# Patient Record
Sex: FEMALE | Race: BLACK OR AFRICAN AMERICAN | NOT HISPANIC OR LATINO | URBAN - METROPOLITAN AREA
[De-identification: names, ages, dates, MRNs, and addresses within clinical notes are randomized per-mention and may not be internally consistent; named-entity substitution may affect disease eponyms.]

---

## 2019-11-22 PROBLEM — 414916001 OBESITY: Status: ACTIVE | Noted: 2019-11-22

## 2019-11-22 PROBLEM — 102614006 GENERALIZED ABDOMINAL PAIN: Status: ACTIVE | Noted: 2019-11-22

## 2019-11-22 PROBLEM — 87522002 IRON DEFICIENCY ANEMIA: Status: ACTIVE | Noted: 2019-11-22

## 2019-11-22 PROBLEM — 422587007 NAUSEA: Status: ACTIVE | Noted: 2019-11-22

## 2019-11-22 PROBLEM — 275978004 SCREENING FOR MALIGNANT NEOPLASM OF COLON: Status: ACTIVE | Noted: 2019-11-22

## 2019-11-22 PROBLEM — 442076002 EARLY SATIETY: Status: ACTIVE | Noted: 2019-11-22

## 2019-11-22 PROBLEM — 14760008 CONSTIPATION: Status: ACTIVE | Noted: 2019-11-22

## 2022-04-30 ENCOUNTER — TELEPHONE ENCOUNTER (OUTPATIENT)
Dept: URBAN - METROPOLITAN AREA CLINIC 121 | Facility: CLINIC | Age: 57
End: 2022-04-30

## 2022-05-01 ENCOUNTER — TELEPHONE ENCOUNTER (OUTPATIENT)
Dept: URBAN - METROPOLITAN AREA CLINIC 121 | Facility: CLINIC | Age: 57
End: 2022-05-01

## 2022-05-01 RX ORDER — ONDANSETRON 8 MG/1
TAKE 1 TABLET PO 30MIN BEFORE EACH DOSE FOR BOWEL PREP TABLET ORAL
Status: ACTIVE | COMMUNITY
Start: 2019-11-22

## 2022-05-01 RX ORDER — SODIUM SULFATE, POTASSIUM SULFATE, MAGNESIUM SULFATE 17.5; 3.13; 1.6 G/ML; G/ML; G/ML
MIX AND USE AS DIRECTED SOLUTION, CONCENTRATE ORAL
Status: ACTIVE | COMMUNITY
Start: 2019-11-22

## 2022-05-01 RX ORDER — METFORMIN HYDROCHLORIDE 1000 MG/1
BID TABLET, EXTENDED RELEASE ORAL
Status: ACTIVE | COMMUNITY
Start: 2019-12-06

## 2024-03-26 ENCOUNTER — OV NP (OUTPATIENT)
Dept: URBAN - METROPOLITAN AREA CLINIC 88 | Facility: CLINIC | Age: 59
End: 2024-03-26
Payer: MEDICARE

## 2024-03-26 ENCOUNTER — LAB (OUTPATIENT)
Dept: URBAN - METROPOLITAN AREA CLINIC 88 | Facility: CLINIC | Age: 59
End: 2024-03-26

## 2024-03-26 VITALS
TEMPERATURE: 98.4 F | BODY MASS INDEX: 34.55 KG/M2 | SYSTOLIC BLOOD PRESSURE: 119 MMHG | WEIGHT: 215 LBS | DIASTOLIC BLOOD PRESSURE: 75 MMHG | OXYGEN SATURATION: 100 % | HEART RATE: 91 BPM | HEIGHT: 66 IN

## 2024-03-26 DIAGNOSIS — R11.2 NAUSEA AND VOMITING IN ADULT: ICD-10-CM

## 2024-03-26 DIAGNOSIS — R10.30 LOWER ABDOMINAL PAIN: ICD-10-CM

## 2024-03-26 DIAGNOSIS — R63.4 UNEXPLAINED WEIGHT LOSS: ICD-10-CM

## 2024-03-26 DIAGNOSIS — R13.19 ESOPHAGEAL DYSPHAGIA: ICD-10-CM

## 2024-03-26 DIAGNOSIS — Z86.39 HISTORY OF DIABETES MELLITUS: ICD-10-CM

## 2024-03-26 DIAGNOSIS — Z98.84 HISTORY OF GASTRIC BYPASS: ICD-10-CM

## 2024-03-26 DIAGNOSIS — K59.00 COLONIC CONSTIPATION: ICD-10-CM

## 2024-03-26 DIAGNOSIS — K62.5 RECTAL BLEEDING: ICD-10-CM

## 2024-03-26 PROBLEM — 161445009: Status: ACTIVE | Noted: 2024-03-26

## 2024-03-26 PROCEDURE — 99205 OFFICE O/P NEW HI 60 MIN: CPT | Performed by: NURSE PRACTITIONER

## 2024-03-26 RX ORDER — ONDANSETRON 8 MG/1
TAKE 1 TABLET PO 30MIN BEFORE EACH DOSE FOR BOWEL PREP TABLET ORAL
Status: DISCONTINUED | COMMUNITY
Start: 2019-11-22

## 2024-03-26 RX ORDER — PANTOPRAZOLE SODIUM 40 MG/1
1 TABLET TABLET, DELAYED RELEASE ORAL
Qty: 90 | Refills: 1 | OUTPATIENT
Start: 2024-03-26

## 2024-03-26 RX ORDER — SODIUM SULFATE, POTASSIUM SULFATE, MAGNESIUM SULFATE 17.5; 3.13; 1.6 G/ML; G/ML; G/ML
MIX AND USE AS DIRECTED SOLUTION, CONCENTRATE ORAL
Status: DISCONTINUED | COMMUNITY
Start: 2019-11-22

## 2024-03-26 RX ORDER — IBUPROFEN 200 MG/1
1 TABLET WITH FOOD OR MILK AS NEEDED TABLET, FILM COATED ORAL THREE TIMES A DAY
Status: ACTIVE | COMMUNITY

## 2024-03-26 RX ORDER — METFORMIN HYDROCHLORIDE 1000 MG/1
BID TABLET, EXTENDED RELEASE ORAL
Status: DISCONTINUED | COMMUNITY
Start: 2019-12-06

## 2024-03-26 NOTE — HPI-TODAY'S VISIT:
58 y.o. presents today to schedule colonoscopy.  Has been observing BRRB to maroon color rectal bleeding mixed in stools intermittently for over 6 months.  Defecation occurs once every 2 days with occasional straining.  Despite straining, stools are soft and semi-formed.  Fails to experience a complete sense of evacuation.  At times feels rectal pressure but unable to pass a stool. Other times, defecation occurs spontaneously.  Lower abdominal cramping and back pain increases with urge for defecation.  Denies abdominal cramping improving with pain.     Denies undergoing prior colonoscopy or family history of colon cancer.  Patient has not had signs of rectal bleeding, changes in bowel habits, constipation, or diarrhea.  Also,  c/o dysphagia to solids that started 2 months ago.  Describes this as sensation of solids becoming lodged mid-retrosternal chest.  Massages this area or increases fluid intake to resolve issue.  This discomfort lasts for several minutes before resolving.  Associated symptoms:  nausea with occasional regurgitation.  Has tried OTC pain medication (acetaminophen or ibuprofen) to manage symptoms.  Denies symptoms of odynophagia.  Reports over 30 lb weight loss over the last 3 months.  Voices hx of gastric bypass surgery in 1999.  Has a hx of DM and HTN.  States her last visit with a primary care was over 2 years.

## 2024-04-05 ENCOUNTER — LAB (OUTPATIENT)
Dept: URBAN - METROPOLITAN AREA CLINIC 4 | Facility: CLINIC | Age: 59
End: 2024-04-05
Payer: MEDICARE

## 2024-04-05 ENCOUNTER — COL/EGD (OUTPATIENT)
Dept: URBAN - METROPOLITAN AREA SURGERY CENTER 24 | Facility: SURGERY CENTER | Age: 59
End: 2024-04-05

## 2024-04-05 DIAGNOSIS — K21.9 GASTRO-ESOPHAGEAL REFLUX DISEASE WITHOUT ESOPHAGITIS: ICD-10-CM

## 2024-04-05 PROCEDURE — 88305 TISSUE EXAM BY PATHOLOGIST: CPT | Performed by: PATHOLOGY

## 2024-04-05 RX ORDER — PANTOPRAZOLE SODIUM 40 MG/1
1 TABLET TABLET, DELAYED RELEASE ORAL
Qty: 90 | Refills: 1 | Status: ACTIVE | COMMUNITY
Start: 2024-03-26

## 2024-04-05 RX ORDER — IBUPROFEN 200 MG/1
1 TABLET WITH FOOD OR MILK AS NEEDED TABLET, FILM COATED ORAL THREE TIMES A DAY
Status: ACTIVE | COMMUNITY

## 2024-04-09 ENCOUNTER — COLON (OUTPATIENT)
Dept: URBAN - METROPOLITAN AREA SURGERY CENTER 24 | Facility: SURGERY CENTER | Age: 59
End: 2024-04-09

## 2024-04-11 ENCOUNTER — COLON (OUTPATIENT)
Dept: URBAN - METROPOLITAN AREA SURGERY CENTER 24 | Facility: SURGERY CENTER | Age: 59
End: 2024-04-11

## 2024-04-11 RX ORDER — IBUPROFEN 200 MG/1
1 TABLET WITH FOOD OR MILK AS NEEDED TABLET, FILM COATED ORAL THREE TIMES A DAY
Status: ACTIVE | COMMUNITY

## 2024-04-11 RX ORDER — PANTOPRAZOLE SODIUM 40 MG/1
1 TABLET TABLET, DELAYED RELEASE ORAL TWICE A DAY
Qty: 120 TABLET | Refills: 1 | Status: ACTIVE | COMMUNITY
Start: 2024-03-26

## 2024-04-12 ENCOUNTER — COLON (OUTPATIENT)
Dept: URBAN - METROPOLITAN AREA SURGERY CENTER 24 | Facility: SURGERY CENTER | Age: 59
End: 2024-04-12
Payer: MEDICARE

## 2024-04-12 DIAGNOSIS — K59.09 CHANGE IN BOWEL MOVEMENTS INTERMITTENT CONSTIPATION. URGENCY IN THE MORNING.: ICD-10-CM

## 2024-04-12 PROCEDURE — 45378 DIAGNOSTIC COLONOSCOPY: CPT | Performed by: INTERNAL MEDICINE
